# Patient Record
(demographics unavailable — no encounter records)

---

## 2024-10-11 NOTE — HISTORY OF PRESENT ILLNESS
[FreeTextEntry1] : 58M with CAD s/p multiple PCI who presents for follow up of angina  Under a lot of work stress, working long hours, late nights, weekends On and off pressure in the chest, usually associated with stressful situations at work  Similar to typical anginal symptoms On and off ranolazine, stopped taking as of late as he had not been feeling as much chest discomfort Last crt 1.24, stable  Now on asa monotherapy  HISTORY:  In 2019- pt was in a restaurant, started to feel a discomfort in his chest. Called EMS, ended up with 2 stents at [facility] Similar episodes in August 2022, felt like he was "overheating" went to [facility] and ended up with 2 more stents 1 month later to [facility] for ongoing CP, was thought to be pericarditis, put on colchicine  Pt started feeling pressure in his chest Went to [facility], was told that cardiac enzymes were negative, had cardiac cath with a 50% RCA lesion, patent stents (per patient report)   Nonsmoker. Works for [employer] as    ECG: SR [facility] 6/2019: PCI to Cx, LAD x2 (XIENCE) C 8/29/22: PCI x pLAD, D1 (SYNERGY)  TTE 9/2023:  1. The left ventricular cavity is normal size. Left ventricular wall thickness is normal. Left ventricular systolic function is normal with an ejection fraction of 66 % by King's method of disks. 2. There is normal left ventricular diastolic function. 3. Right ventricular cavity is normal in size, normal wall thickness and normal systolic function. The tricuspid annular plane systolic excursion (TAPSE) is 2.4 cm (normal >=1.7 cm). 4. The left atrium is normal in size. 5. No significant valvular disease.  Asa 81mg Atorvastatin 80mg Zetia 10mg  Ranolazine 500mg BID   Losartan 50mg Toprol 25mg  Jardiance 25mg Trulicity

## 2024-10-11 NOTE — DISCUSSION/SUMMARY
[FreeTextEntry1] : CAD: s/p PCI x5. Anginal symptoms improved with ranolazine in past, now off. Rare angina with stress.  OK to hold on Ranolazine, can resume if anginal symptoms start to become more consistent  Cont Toprol  On asa monotherapy given easy bruising   Cont high dose statin- lipids improved, last LDL 82  HTN: BP good. Cont meds. Crt improved watch closely   HLD: Lipids back down. Cont high dose statin. Watch diet. Normal AST/ALT  DM: Last a1c 9.7%, needs more aggressive control  RV 6M

## 2024-10-31 NOTE — PHYSICAL EXAM
[No Acute Distress] : no acute distress [Well Nourished] : well nourished [Well Developed] : well developed [Well-Appearing] : well-appearing [Normal Sclera/Conjunctiva] : normal sclera/conjunctiva [PERRL] : pupils equal round and reactive to light [EOMI] : extraocular movements intact [Normal Outer Ear/Nose] : the outer ears and nose were normal in appearance [Normal Oropharynx] : the oropharynx was normal [No JVD] : no jugular venous distention [No Lymphadenopathy] : no lymphadenopathy [Supple] : supple [Thyroid Normal, No Nodules] : the thyroid was normal and there were no nodules present [No Respiratory Distress] : no respiratory distress  [No Accessory Muscle Use] : no accessory muscle use [Clear to Auscultation] : lungs were clear to auscultation bilaterally [Normal Rate] : normal rate  [Regular Rhythm] : with a regular rhythm [Normal S1, S2] : normal S1 and S2 [No Murmur] : no murmur heard [No Carotid Bruits] : no carotid bruits [No Abdominal Bruit] : a ~M bruit was not heard ~T in the abdomen [No Varicosities] : no varicosities [Pedal Pulses Present] : the pedal pulses are present [No Edema] : there was no peripheral edema [No Palpable Aorta] : no palpable aorta [No Extremity Clubbing/Cyanosis] : no extremity clubbing/cyanosis [Soft] : abdomen soft [Non Tender] : non-tender [Non-distended] : non-distended [No Masses] : no abdominal mass palpated [No HSM] : no HSM [Normal Bowel Sounds] : normal bowel sounds [Normal Posterior Cervical Nodes] : no posterior cervical lymphadenopathy [Normal Anterior Cervical Nodes] : no anterior cervical lymphadenopathy [No CVA Tenderness] : no CVA  tenderness [No Spinal Tenderness] : no spinal tenderness [No Joint Swelling] : no joint swelling [Grossly Normal Strength/Tone] : grossly normal strength/tone [No Rash] : no rash [Coordination Grossly Intact] : coordination grossly intact [No Focal Deficits] : no focal deficits [Normal Gait] : normal gait [Deep Tendon Reflexes (DTR)] : deep tendon reflexes were 2+ and symmetric [Normal Affect] : the affect was normal [Normal Insight/Judgement] : insight and judgment were intact [de-identified] : R hand 5th digit duptyrens. severe. [de-identified] : R shin- superficial abrasion- not infected

## 2024-10-31 NOTE — HISTORY OF PRESENT ILLNESS
[FreeTextEntry1] : leg scrape The patient is here for a follow up visit to review their medications and chronic medical conditions.  cad,htn [de-identified] : Here for follow up visit. Doing well. Appetitie, sleep,bms,voiding, mood all ok.  Interim and relevant labs, imaging and consultations reviewed.  scraped R shin.

## 2024-10-31 NOTE — ASSESSMENT
[FreeTextEntry1] : CAD- s/p 2 addl stents, stable, cardio f/u , cont atorvastatin,asa,effient,metoprolol-  DM2-, cont , glimepiride, jardiance, alogliptin, trulicity Htn- stable, cont losartan,metoprolol, HLD- stable, cont atorvastatin Duputyrens- f/u with hand surg [place]- hearing aides Bilat Foot Callus- pod eval. R shin abrasion- bacitracin, dpd.  callous- pod eval allergic rhinitis- ryaltris orthostatic hypotension- hydrate. stable GERD- lifestyle mod, trial pantopr.  Pt. was encouraged to do all relevant screening tests including but not limited to colonoscopy, annual psa, annual skin exam.  see optho yearly chk feet nightly take all meds as prescribed aware of complications of diabetes including but not limited to retinopathy,neuropathy, cvd, esrd,cva,amputations.  aware of the importance of diet and exercise and maintaining an adequate weight chk labs   Total time spent 40 min. ( 25 min was FTF and 15 min was chart review and documentation for a total of 40 min. ).

## 2024-11-07 NOTE — PHYSICAL EXAM
[No Acute Distress] : no acute distress [Well Nourished] : well nourished [Well Developed] : well developed [Well-Appearing] : well-appearing [Normal Sclera/Conjunctiva] : normal sclera/conjunctiva [PERRL] : pupils equal round and reactive to light [EOMI] : extraocular movements intact [Normal Outer Ear/Nose] : the outer ears and nose were normal in appearance [Normal Oropharynx] : the oropharynx was normal [No JVD] : no jugular venous distention [No Lymphadenopathy] : no lymphadenopathy [Supple] : supple [Thyroid Normal, No Nodules] : the thyroid was normal and there were no nodules present [No Respiratory Distress] : no respiratory distress  [No Accessory Muscle Use] : no accessory muscle use [Clear to Auscultation] : lungs were clear to auscultation bilaterally [Normal Rate] : normal rate  [Regular Rhythm] : with a regular rhythm [Normal S1, S2] : normal S1 and S2 [No Murmur] : no murmur heard [No Carotid Bruits] : no carotid bruits [No Abdominal Bruit] : a ~M bruit was not heard ~T in the abdomen [No Varicosities] : no varicosities [Pedal Pulses Present] : the pedal pulses are present [No Edema] : there was no peripheral edema [No Palpable Aorta] : no palpable aorta [No Extremity Clubbing/Cyanosis] : no extremity clubbing/cyanosis [Soft] : abdomen soft [Non Tender] : non-tender [Non-distended] : non-distended [No Masses] : no abdominal mass palpated [No HSM] : no HSM [Normal Bowel Sounds] : normal bowel sounds [Normal Posterior Cervical Nodes] : no posterior cervical lymphadenopathy [Normal Anterior Cervical Nodes] : no anterior cervical lymphadenopathy [No CVA Tenderness] : no CVA  tenderness [No Spinal Tenderness] : no spinal tenderness [No Joint Swelling] : no joint swelling [Grossly Normal Strength/Tone] : grossly normal strength/tone [No Rash] : no rash [Coordination Grossly Intact] : coordination grossly intact [No Focal Deficits] : no focal deficits [Normal Gait] : normal gait [Deep Tendon Reflexes (DTR)] : deep tendon reflexes were 2+ and symmetric [Normal Affect] : the affect was normal [Normal Insight/Judgement] : insight and judgment were intact [de-identified] : R hand 5th digit duptyrens. severe. [de-identified] : R shin- superficial abrasion- min surrounding erythema-gran tissue, no streaking or pus.

## 2024-11-07 NOTE — HISTORY OF PRESENT ILLNESS
[FreeTextEntry1] : wound chk [de-identified] : R leg red vertigo? - no spinning sensation. 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 no falls or headache.  pt has dm.

## 2024-11-07 NOTE — ASSESSMENT
[FreeTextEntry1] : CAD- s/p 2 addl stents, stable, cardio f/u , cont atorvastatin,asa,effient,metoprolol-  DM2-, cont , glimepiride, jardiance, alogliptin, trulicity Htn- stable, cont losartan,metoprolol, HLD- stable, cont atorvastatin Duputyrens- f/u with hand surg [facility]- hearing aides Bilat Foot Callus- pod eval. R shin abrasion- bacitracin, dpd.I dont feel its infected but pt is concerned. its on anterior shin and pt is diabetic. will start clinda x 5d, Monitor symptoms and report any changes or concerns.   callous- pod eval allergic rhinitis- ryaltris lightheaded/imbalance- neuro eval- ddx- diab neuropathy/orthostatic hypotension/med side effect.  GERD- lifestyle mod, trial pantopr.  Pt. was encouraged to do all relevant screening tests including but not limited to colonoscopy, annual psa, annual skin exam.  see optho yearly chk feet nightly take all meds as prescribed aware of complications of diabetes including but not limited to retinopathy,neuropathy, cvd, esrd,cva,amputations.  aware of the importance of diet and exercise and maintaining an adequate weight chk labs   Total time 30 min ( 20 min. FTF and 10 min chart review and documentation.) .

## 2024-12-13 NOTE — DATA REVIEWED
[de-identified] : An audiogram was ordered and performed including pure tones, tympanometry and speech testing for the patients complaint of hearing loss I have independently reviewed the patient's audiogram from today and my findings include   AS profound SNHL 250-8k hz. AD Moderately severe-severe SNHL 250-8k hz. Tinnitus Match reports close to 250 hz but inconclusive could not mask bone AS

## 2024-12-13 NOTE — ASSESSMENT
[FreeTextEntry1] : 56Y M following for Left sided sensory neural deafness and Right Severe SNHL currently using hearing aids on the right side. 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 Right hearing loss due to chicken pox when he was a kid  Personally reviewed Audiogram shows AS profound SNHL 250-8k hz. AD Moderately severe-severe SNHL 250-8k hz. Tinnitus Match reports close to 250 hz but inconclusive could not mask bone AS  Compared audiogram from [facility] on 7/15/24 possible acute decreased in Low frequency hearing loss in right ear. Given clinical picture and patient already limited hearing. Patient would prefer a trial of PO steroids to see if there can be some improvement in hearing   Recommend Right Sudden Sensorineural Hearing Loss -Discussed variety of disorders affecting the ear can cause SSHL, but only about 10 percent of people diagnosed with SSHL have an identifiable cause. Majority is idiopathic.  -Explained that the only known treatment for sudden deafness, especially when the cause is unknown, is corticosteroids. Steroids treat the disorders by working to reducing inflammation, decreasing swelling within the nerve of hearing -Explained that sometimes despite appropriate treatment in a timely manner hearing may not return to baseline. -Discussed the risk and benefits between PO steroids and Intratympanic steroids. 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 -Continue using Hearing Aids adjusted for the most current audiogram. Patient can look into BiCROS  Tinnitus -Discussed that Hearing Aids may help with relieving some of the tinnitus. Tinnitus usually follows the same pattern of hearing loss and can be attributed to phantom sounds in the brain filling in for the loss of hearing in those particular frequencies -Discussed that Tinnitus usually can be attributed to phantom sounds in the brain filling in for sounds. If the tinnitus is brief only last for a few seconds with no loss of hearing associated. It is usually physiological and can be management conservatively -Discussed notched therapy, masking therapy, cognitive behavioral therapy, factors that may influence tinnitus, and discussed limited benefit of tinnitus supplements.  -Return to clinic in 2 weeks or sooner if new/worsen symptoms present

## 2024-12-13 NOTE — HISTORY OF PRESENT ILLNESS
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 Denies otalgia, otorrhea, recent ear infections, dizziness, vertigo, headaches related to ears, recent fevers.

## 2024-12-27 NOTE — DATA REVIEWED
[de-identified] : An audiogram was ordered and performed including pure tones, tympanometry and speech testing for the patients complaint of hearing loss I have independently reviewed the patient's audiogram from today and my findings include Right - moderate to profound sensorineural hearing loss  Left - DNT -known profound HL Tymp Right - CNS Left - Type B tympanogram consistent with conductive pathology  Matched tinnitus closest to 250Hz @ 70dB

## 2024-12-27 NOTE — HISTORY OF PRESENT ILLNESS
[de-identified] : 59 year old man follow up for sudden hearing loss  History of bilateral hearing loss, wearing hearing aid on Right only. 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 Right hear loss due to chicken pox when he was a kid Reports sudden right sided hearing loss (12/12/24) after removing hearing aid and heard alot of "noise". States completed oral steroids as prescribed.  States hearing has improved.  Denies otalgia, otorrhea, recent ear infections, dizziness, vertigo, headaches related to ears, recent fevers. Patient's blood pressure was 170/91 . Patient was advised to follow up with PCP for high blood pressure.

## 2024-12-27 NOTE — ASSESSMENT
[FreeTextEntry1] : 56Y M following for Left sided sensory neural deafness and Right Severe SNHL currently using hearing aids on the right side. 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 Right hearing loss due to chicken pox when he was a kid  12/13/24 Audiogram shows AS profound SNHL 250-8k hz. AD Moderately severe-severe SNHL 250-8k hz. Tinnitus Match reports close to 250 hz but inconclusive could not mask bone AS  Personally reviewed Audiogram shows Right - moderate to profound sensorineural hearing loss. Left - DNT -known profound HL. AD Tymp CNS. AS Type B Matched tinnitus closest to 250Hz @ 70dB Improved low frequency hearing loss from 12/13/24  Recommend Right Sudden Sensorineural Hearing Loss -Improved -Completed Prednisone PO. States hearing is back to baseline. 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 -Continue using Hearing Aids adjusted for the most current audiogram. Patient can look into BiCROS  Tinnitus -Discussed that Hearing Aids may help with relieving some of the tinnitus. Tinnitus usually follows the same pattern of hearing loss and can be attributed to phantom sounds in the brain filling in for the loss of hearing in those particular frequencies -Discussed that Tinnitus usually can be attributed to phantom sounds in the brain filling in for sounds. If the tinnitus is brief only last for a few seconds with no loss of hearing associated. It is usually physiological and can be management conservatively -Discussed notched therapy, masking therapy, cognitive behavioral therapy, factors that may influence tinnitus, and discussed limited benefit of tinnitus supplements.  -Return to clinic as needed or sooner if new/worsen symptoms present

## 2024-12-28 NOTE — PHYSICAL EXAM
[No Acute Distress] : no acute distress [Well Nourished] : well nourished [Well Developed] : well developed [Well-Appearing] : well-appearing [Normal Sclera/Conjunctiva] : normal sclera/conjunctiva [PERRL] : pupils equal round and reactive to light [EOMI] : extraocular movements intact [Normal Outer Ear/Nose] : the outer ears and nose were normal in appearance [Normal Oropharynx] : the oropharynx was normal [No JVD] : no jugular venous distention [No Lymphadenopathy] : no lymphadenopathy [Supple] : supple [Thyroid Normal, No Nodules] : the thyroid was normal and there were no nodules present [No Respiratory Distress] : no respiratory distress  [No Accessory Muscle Use] : no accessory muscle use [Clear to Auscultation] : lungs were clear to auscultation bilaterally [Normal Rate] : normal rate  [Regular Rhythm] : with a regular rhythm [Normal S1, S2] : normal S1 and S2 [No Murmur] : no murmur heard [No Carotid Bruits] : no carotid bruits [No Abdominal Bruit] : a ~M bruit was not heard ~T in the abdomen [No Varicosities] : no varicosities [Pedal Pulses Present] : the pedal pulses are present [No Edema] : there was no peripheral edema [No Palpable Aorta] : no palpable aorta [No Extremity Clubbing/Cyanosis] : no extremity clubbing/cyanosis [Soft] : abdomen soft [Non Tender] : non-tender [Non-distended] : non-distended [No Masses] : no abdominal mass palpated [No HSM] : no HSM [Normal Bowel Sounds] : normal bowel sounds [Normal Posterior Cervical Nodes] : no posterior cervical lymphadenopathy [Normal Anterior Cervical Nodes] : no anterior cervical lymphadenopathy [No CVA Tenderness] : no CVA  tenderness [No Spinal Tenderness] : no spinal tenderness [No Joint Swelling] : no joint swelling [Grossly Normal Strength/Tone] : grossly normal strength/tone [No Rash] : no rash [Coordination Grossly Intact] : coordination grossly intact [No Focal Deficits] : no focal deficits [Normal Gait] : normal gait [Deep Tendon Reflexes (DTR)] : deep tendon reflexes were 2+ and symmetric [Normal Affect] : the affect was normal [Normal Insight/Judgement] : insight and judgment were intact [de-identified] : hearing aids [de-identified] : no hernias [de-identified] : R hand 5th digit duptyrens. severe.

## 2024-12-28 NOTE — ASSESSMENT
[FreeTextEntry1] : CAD- s/p 2 addl stents, stable, cardio f/u , cont atorvastatin,asa,effient,metoprolol- start colchicine  DM2-, cont , glimepiride, jardiance, alogliptin, trulicity Htn- stable, cont losartan,metoprolol, HLD- stable, cont atorvastatin Duputyrens- f/u with hand surg [place]- hearing aides Bilat Foot Callus- pod eval. callous- pod eval allergic rhinitis- ryaltris lightheaded/imbalance- neuro eval- ddx- diab neuropathy/orthostatic hypotension/med side effect.  GERD- lifestyle mod, trial pantopr.  acute prostatitis vs uti- chk u/a, c/s, nitrofurantoin Pt. was encouraged to do all relevant screening tests including but not limited to colonoscopy, annual psa, annual skin exam.  see optho yearly chk feet nightly take all meds as prescribed aware of complications of diabetes including but not limited to retinopathy,neuropathy, cvd, esrd,cva,amputations.  aware of the importance of diet and exercise and maintaining an adequate weight chk labs  Discussed the importance of screening for colon cancer. Reviewed screening reccomendations including colonoscopy, Cologuard and Fit DNA testing. I strongly encouraged colonoscopy as that is the best screening test to detect both colon cancer and polyps and is the gold standard.  Discussed the importance and benefit of a healthy lifestyle including a heart healthy diet such as the Mediterranean diet and regular exercise as well as 7 hours of sleep nightly.

## 2024-12-28 NOTE — HEALTH RISK ASSESSMENT
[Good] : ~his/her~  mood as  good [Never (0 pts)] : Never (0 points) [No] : In the past 12 months have you used drugs other than those required for medical reasons? No [No falls in past year] : Patient reported no falls in the past year [0] : 2) Feeling down, depressed, or hopeless: Not at all (0) [PHQ-2 Negative - No further assessment needed] : PHQ-2 Negative - No further assessment needed [Never] : Never [None] : None [With Family] : lives with family [Employed] : employed [Single] : single [Fully functional (bathing, dressing, toileting, transferring, walking, feeding)] : Fully functional (bathing, dressing, toileting, transferring, walking, feeding) [Fully functional (using the telephone, shopping, preparing meals, housekeeping, doing laundry, using] : Fully functional and needs no help or supervision to perform IADLs (using the telephone, shopping, preparing meals, housekeeping, doing laundry, using transportation, managing medications and managing finances) [Reports changes in hearing] : Reports changes in hearing [Audit-CScore] : 0 [de-identified] : none [de-identified] : reg [[id number]] : 0 [Change in mental status noted] : No change in mental status noted [Language] : denies difficulty with language [Behavior] : denies difficulty with behavior [Handling Complex Tasks] : denies difficulty handling complex tasks [Reasoning] : denies difficulty with reasoning [Reports changes in vision] : Reports no changes in vision [Reports changes in dental health] : Reports no changes in dental health [de-identified] : saw retinologist [AdvancecareDate] : 12/26/24

## 2024-12-28 NOTE — PHYSICAL EXAM
[No Acute Distress] : no acute distress [Well Nourished] : well nourished [Well Developed] : well developed [Well-Appearing] : well-appearing [Normal Sclera/Conjunctiva] : normal sclera/conjunctiva [PERRL] : pupils equal round and reactive to light [EOMI] : extraocular movements intact [Normal Outer Ear/Nose] : the outer ears and nose were normal in appearance [Normal Oropharynx] : the oropharynx was normal [No JVD] : no jugular venous distention [No Lymphadenopathy] : no lymphadenopathy [Supple] : supple [Thyroid Normal, No Nodules] : the thyroid was normal and there were no nodules present [No Respiratory Distress] : no respiratory distress  [No Accessory Muscle Use] : no accessory muscle use [Clear to Auscultation] : lungs were clear to auscultation bilaterally [Normal Rate] : normal rate  [Regular Rhythm] : with a regular rhythm [Normal S1, S2] : normal S1 and S2 [No Murmur] : no murmur heard [No Carotid Bruits] : no carotid bruits [No Abdominal Bruit] : a ~M bruit was not heard ~T in the abdomen [No Varicosities] : no varicosities [Pedal Pulses Present] : the pedal pulses are present [No Edema] : there was no peripheral edema [No Palpable Aorta] : no palpable aorta [No Extremity Clubbing/Cyanosis] : no extremity clubbing/cyanosis [Soft] : abdomen soft [Non Tender] : non-tender [Non-distended] : non-distended [No Masses] : no abdominal mass palpated [No HSM] : no HSM [Normal Bowel Sounds] : normal bowel sounds [Normal Posterior Cervical Nodes] : no posterior cervical lymphadenopathy [Normal Anterior Cervical Nodes] : no anterior cervical lymphadenopathy [No CVA Tenderness] : no CVA  tenderness [No Spinal Tenderness] : no spinal tenderness [No Joint Swelling] : no joint swelling [Grossly Normal Strength/Tone] : grossly normal strength/tone [No Rash] : no rash [Coordination Grossly Intact] : coordination grossly intact [No Focal Deficits] : no focal deficits [Normal Gait] : normal gait [Deep Tendon Reflexes (DTR)] : deep tendon reflexes were 2+ and symmetric [Normal Affect] : the affect was normal [Normal Insight/Judgement] : insight and judgment were intact [de-identified] : hearing aids [de-identified] : no hernias [de-identified] : R hand 5th digit duptyrens. severe.

## 2024-12-28 NOTE — HISTORY OF PRESENT ILLNESS
[FreeTextEntry1] : abisai [de-identified] : urinary sympt x wks , worse over the past few days. no buring. no hesitancy. no fever or chills.  no flank pain.  had some hearing loss in his good ear- saw ent- started on pred 60 taper, does not chk his bgs.  sees pod q3m sees card sees optho/retina.  sees [facility]. sees colorectal. Reviewed all interim as well as relevant prior consultations, labs and radiological studies.

## 2024-12-28 NOTE — ASSESSMENT
[FreeTextEntry1] : CAD- s/p 2 addl stents, stable, cardio f/u , cont atorvastatin,asa,effient,metoprolol- start colchicine  DM2-, cont , glimepiride, jardiance, alogliptin, trulicity Htn- stable, cont losartan,metoprolol, HLD- stable, cont atorvastatin Duputyrens- f/u with hand surg [place]- hearing aides Bilat Foot Callus- pod eval. callous- pod eval allergic rhinitis- ryaltris lightheaded/imbalance- neuro eval- ddx- diab neuropathy/orthostatic hypotension/med side effect.  GERD- lifestyle mod, trial pantopr.  acute prostatitis vs uti- chk u/a, c/s, nitrofurantoin Pt. was encouraged to do all relevant screening tests including but not limited to colonoscopy, annual psa, annual skin exam.  see optho yearly chk feet nightly take all meds as prescribed aware of complications of diabetes including but not limited to retinopathy,neuropathy, cvd, esrd,cva,amputations.  aware of the importance of diet and exercise and maintaining an adequate weight chk labs  Discussed the importance of screening for colon cancer. Reviewed screening reccomendations including colonoscopy, Cologuard and Fit DNA testing. I strongly encouraged colonoscopy as that is the best screening test to detect both colon cancer and polyps and is the gold standard.  Discussed the importance and benefit of a healthy lifestyle including a heart healthy diet such as the Mediterranean diet and regular exercise as well as 7 hours of sleep nightly.

## 2024-12-28 NOTE — HEALTH RISK ASSESSMENT
[Good] : ~his/her~  mood as  good [Never (0 pts)] : Never (0 points) [No] : In the past 12 months have you used drugs other than those required for medical reasons? No [No falls in past year] : Patient reported no falls in the past year [0] : 2) Feeling down, depressed, or hopeless: Not at all (0) [PHQ-2 Negative - No further assessment needed] : PHQ-2 Negative - No further assessment needed [Never] : Never [None] : None [With Family] : lives with family [Employed] : employed [Single] : single [Fully functional (bathing, dressing, toileting, transferring, walking, feeding)] : Fully functional (bathing, dressing, toileting, transferring, walking, feeding) [Fully functional (using the telephone, shopping, preparing meals, housekeeping, doing laundry, using] : Fully functional and needs no help or supervision to perform IADLs (using the telephone, shopping, preparing meals, housekeeping, doing laundry, using transportation, managing medications and managing finances) [Reports changes in hearing] : Reports changes in hearing [Audit-CScore] : 0 [de-identified] : none [de-identified] : reg [WQT4Hpaob] : 0 [Change in mental status noted] : No change in mental status noted [Language] : denies difficulty with language [Behavior] : denies difficulty with behavior [Handling Complex Tasks] : denies difficulty handling complex tasks [Reasoning] : denies difficulty with reasoning [Reports changes in vision] : Reports no changes in vision [Reports changes in dental health] : Reports no changes in dental health [de-identified] : saw retinologist [AdvancecareDate] : 12/26/24

## 2024-12-28 NOTE — HISTORY OF PRESENT ILLNESS
[FreeTextEntry1] : abisai [de-identified] : urinary sympt x wks , worse over the past few days. no buring. no hesitancy. no fever or chills.  no flank pain.  had some hearing loss in his good ear- saw ent- started on pred 60 taper, does not chk his bgs.  sees pod q3m sees card sees optho/retina.  sees [facility]. sees colorectal. Reviewed all interim as well as relevant prior consultations, labs and radiological studies.

## 2025-01-25 NOTE — ASSESSMENT
[FreeTextEntry1] : CAD- s/p 2 addl stents, stable, cardio f/u , cont atorvastatin,asa,effient,metoprolol- colchicine  DM2-, cont , glimepiride, jardiance, alogliptin, trulicity Htn- stable, cont losartan,metoprolol, HLD- stable, cont atorvastatin Duputyrens- f/u with hand surg [place]- hearing aides Bilat Foot Callus- pod eval. callous- pod eval allergic rhinitis- ryaltris lightheaded/imbalance- resolved GERD- lifestyle mod, trial pantopr.  acute prostatitis vs uti- resolved Pt. was encouraged to do all relevant screening tests including but not limited to colonoscopy, annual psa, annual skin exam.  see optho yearly chk feet nightly take all meds as prescribed aware of complications of diabetes including but not limited to retinopathy,neuropathy, cvd, esrd,cva,amputations.  aware of the importance of diet and exercise and maintaining an adequate weight chk labs  Discussed the importance of screening for colon cancer. Reviewed screening reccomendations including colonoscopy, Cologuard and Fit DNA testing. I strongly encouraged colonoscopy as that is the best screening test to detect both colon cancer and polyps and is the gold standard.  Discussed the importance and benefit of a healthy lifestyle including a heart healthy diet such as the Mediterranean diet and regular exercise as well as 7 hours of sleep nightly.

## 2025-01-25 NOTE — HISTORY OF PRESENT ILLNESS
[FreeTextEntry1] : The patient is here for a follow up visit to review their medications and chronic medical conditions.  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 uti sympt resolved Otherwise feels good. Appet, sleep, bms, voiding, mood all ok. no pain.  Reviewed all interim as well as relevant prior consultations, labs and radiological studies.

## 2025-01-25 NOTE — PHYSICAL EXAM
[No Acute Distress] : no acute distress [Well Nourished] : well nourished [Well Developed] : well developed [Well-Appearing] : well-appearing [Normal Sclera/Conjunctiva] : normal sclera/conjunctiva [PERRL] : pupils equal round and reactive to light [EOMI] : extraocular movements intact [Normal Outer Ear/Nose] : the outer ears and nose were normal in appearance [Normal Oropharynx] : the oropharynx was normal [No JVD] : no jugular venous distention [No Lymphadenopathy] : no lymphadenopathy [Supple] : supple [Thyroid Normal, No Nodules] : the thyroid was normal and there were no nodules present [No Respiratory Distress] : no respiratory distress  [No Accessory Muscle Use] : no accessory muscle use [Clear to Auscultation] : lungs were clear to auscultation bilaterally [Normal Rate] : normal rate  [Regular Rhythm] : with a regular rhythm [Normal S1, S2] : normal S1 and S2 [No Murmur] : no murmur heard [No Carotid Bruits] : no carotid bruits [No Abdominal Bruit] : a ~M bruit was not heard ~T in the abdomen [No Varicosities] : no varicosities [Pedal Pulses Present] : the pedal pulses are present [No Edema] : there was no peripheral edema [No Palpable Aorta] : no palpable aorta [No Extremity Clubbing/Cyanosis] : no extremity clubbing/cyanosis [Soft] : abdomen soft [Non Tender] : non-tender [Non-distended] : non-distended [No Masses] : no abdominal mass palpated [No HSM] : no HSM [Normal Bowel Sounds] : normal bowel sounds [Normal Posterior Cervical Nodes] : no posterior cervical lymphadenopathy [Normal Anterior Cervical Nodes] : no anterior cervical lymphadenopathy [No CVA Tenderness] : no CVA  tenderness [No Spinal Tenderness] : no spinal tenderness [No Joint Swelling] : no joint swelling [Grossly Normal Strength/Tone] : grossly normal strength/tone [No Rash] : no rash [Coordination Grossly Intact] : coordination grossly intact [No Focal Deficits] : no focal deficits [Normal Gait] : normal gait [Deep Tendon Reflexes (DTR)] : deep tendon reflexes were 2+ and symmetric [Normal Affect] : the affect was normal [Normal Insight/Judgement] : insight and judgment were intact [de-identified] : hearing aids [de-identified] : R hand 5th digit duptyrens. severe.

## 2025-01-25 NOTE — ASSESSMENT
[FreeTextEntry1] : CAD- s/p 2 addl stents, stable, cardio f/u , cont atorvastatin,asa,effient,metoprolol- colchicine  DM2-, cont , glimepiride, jardiance, alogliptin, trulicity Htn- stable, cont losartan,metoprolol, HLD- stable, cont atorvastatin Duputyrens- f/u with hand surg [place]- hearing aides Bilat Foot Callus- pod eval. callous- pod eval allergic rhinitis- ryaltris lightheaded/imbalance- resolved GERD- lifestyle mod, trial pantopr.  acute prostatitis vs uti- resolved Pt. was encouraged to do all relevant screening tests including but not limited to colonoscopy, annual psa, annual skin exam.  see optho yearly chk feet nightly take all meds as prescribed aware of complications of diabetes including but not limited to retinopathy,neuropathy, cvd, esrd,cva,amputations.  aware of the importance of diet and exercise and maintaining an adequate weight chk labs  Discussed the importance of screening for colon cancer. Reviewed screening reccomendations including colonoscopy, Cologuard and Fit DNA testing. I strongly encouraged colonoscopy as that is the best screening test to detect both colon cancer and polyps and is the gold standard.  Discussed the importance and benefit of a healthy lifestyle including a heart healthy diet such as the Mediterranean diet and regular exercise as well as 7 hours of sleep nightly.

## 2025-01-25 NOTE — END OF VISIT
[de-identified] : The patient is a 44-year-old female who presents with left knee pain.  The left knee pain is acute on chronic pain.  Patient has a history of ACL reconstruction in the late 90's from basketball injury.  The patient recently started to work out and has increased left knee pain.  She states that the left knee is not locking, catching or giving out on her.  Her main complaint is a pain with standing and walking, swelling and bending the knee/physical activities.  Pain scale in office today 7/10.  No other complaints.  Patient is walking with a limp.  Patient uses a OTC brace which helped her at home.  No numbness or tingling to the left leg.  No other complaints.   [Time Spent: ___ minutes] : I have spent [unfilled] minutes of time on the encounter which excludes teaching and separately reported services.

## 2025-01-25 NOTE — PHYSICAL EXAM
[No Acute Distress] : no acute distress [Well Nourished] : well nourished [Well Developed] : well developed [Well-Appearing] : well-appearing [Normal Sclera/Conjunctiva] : normal sclera/conjunctiva [PERRL] : pupils equal round and reactive to light [EOMI] : extraocular movements intact [Normal Outer Ear/Nose] : the outer ears and nose were normal in appearance [Normal Oropharynx] : the oropharynx was normal [No JVD] : no jugular venous distention [No Lymphadenopathy] : no lymphadenopathy [Supple] : supple [Thyroid Normal, No Nodules] : the thyroid was normal and there were no nodules present [No Respiratory Distress] : no respiratory distress  [No Accessory Muscle Use] : no accessory muscle use [Clear to Auscultation] : lungs were clear to auscultation bilaterally [Normal Rate] : normal rate  [Regular Rhythm] : with a regular rhythm [Normal S1, S2] : normal S1 and S2 [No Murmur] : no murmur heard [No Carotid Bruits] : no carotid bruits [No Abdominal Bruit] : a ~M bruit was not heard ~T in the abdomen [No Varicosities] : no varicosities [Pedal Pulses Present] : the pedal pulses are present [No Edema] : there was no peripheral edema [No Palpable Aorta] : no palpable aorta [No Extremity Clubbing/Cyanosis] : no extremity clubbing/cyanosis [Soft] : abdomen soft [Non Tender] : non-tender [Non-distended] : non-distended [No Masses] : no abdominal mass palpated [No HSM] : no HSM [Normal Bowel Sounds] : normal bowel sounds [Normal Posterior Cervical Nodes] : no posterior cervical lymphadenopathy [Normal Anterior Cervical Nodes] : no anterior cervical lymphadenopathy [No CVA Tenderness] : no CVA  tenderness [No Spinal Tenderness] : no spinal tenderness [No Joint Swelling] : no joint swelling [Grossly Normal Strength/Tone] : grossly normal strength/tone [No Rash] : no rash [Coordination Grossly Intact] : coordination grossly intact [No Focal Deficits] : no focal deficits [Normal Gait] : normal gait [Deep Tendon Reflexes (DTR)] : deep tendon reflexes were 2+ and symmetric [Normal Affect] : the affect was normal [Normal Insight/Judgement] : insight and judgment were intact [de-identified] : hearing aids [de-identified] : R hand 5th digit duptyrens. severe.

## 2025-01-25 NOTE — HISTORY OF PRESENT ILLNESS
[FreeTextEntry1] : The patient is here for a follow up visit to review their medications and chronic medical conditions.  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 uti sympt resolved Otherwise feels good. Appet, sleep, bms, voiding, mood all ok. no pain.  Reviewed all interim as well as relevant prior consultations, labs and radiological studies.  Yes

## 2025-02-25 NOTE — HISTORY OF PRESENT ILLNESS
[FreeTextEntry1] : The patient is here for a follow up visit to review their medications and chronic medical conditions.  cad,dm [de-identified] : left tmj pain. headache. x few days.  Otherwise feels good. Appet, sleep, bms, voiding, mood all ok Reviewed all interim as well as relevant prior consultations, labs and radiological studies.

## 2025-02-25 NOTE — HISTORY OF PRESENT ILLNESS
[FreeTextEntry1] : The patient is here for a follow up visit to review their medications and chronic medical conditions.  cad,dm [de-identified] : left tmj pain. headache. x few days.  Otherwise feels good. Appet, sleep, bms, voiding, mood all ok Reviewed all interim as well as relevant prior consultations, labs and radiological studies.

## 2025-02-25 NOTE — ASSESSMENT
[FreeTextEntry1] : CAD- s/p 2 addl stents, stable, cardio f/u , cont atorvastatin,asa,effient,metoprolol- colchicine  DM2-, cont , glimepiride, jardiance, alogliptin, trulicity Htn- stable, cont losartan,metoprolol, HLD- stable, cont atorvastatin Duputyrens- f/u with hand surg [place]- hearing aides Bilat Foot Callus- pod eval. allergic rhinitis- ryaltris GERD- lifestyle mod, pantopr.  tmj- avoid nsaids due to asa/cad. avoid chewy foods,tylenol, warm compress.  headache- resolves spont. monitor for now. if recurs. consider muscle relax.  Pt. was encouraged to do all relevant screening tests including but not limited to colonoscopy, annual psa, annual skin exam.  see optho yearly chk feet nightly take all meds as prescribed aware of complications of diabetes including but not limited to retinopathy,neuropathy, cvd, esrd,cva,amputations.  aware of the importance of diet and exercise and maintaining an adequate weight chk labs  Discussed the importance and benefit of a healthy lifestyle including a heart healthy diet such as the Mediterranean diet and regular exercise as well as 7 hours of sleep nightly.

## 2025-02-25 NOTE — PHYSICAL EXAM
[No Acute Distress] : no acute distress [Well Nourished] : well nourished [Well Developed] : well developed [Well-Appearing] : well-appearing [Normal Sclera/Conjunctiva] : normal sclera/conjunctiva [PERRL] : pupils equal round and reactive to light [EOMI] : extraocular movements intact [Normal Outer Ear/Nose] : the outer ears and nose were normal in appearance [Normal Oropharynx] : the oropharynx was normal [No JVD] : no jugular venous distention [No Lymphadenopathy] : no lymphadenopathy [Supple] : supple [Thyroid Normal, No Nodules] : the thyroid was normal and there were no nodules present [No Respiratory Distress] : no respiratory distress  [No Accessory Muscle Use] : no accessory muscle use [Clear to Auscultation] : lungs were clear to auscultation bilaterally [Normal Rate] : normal rate  [Regular Rhythm] : with a regular rhythm [Normal S1, S2] : normal S1 and S2 [No Murmur] : no murmur heard [No Carotid Bruits] : no carotid bruits [No Abdominal Bruit] : a ~M bruit was not heard ~T in the abdomen [No Varicosities] : no varicosities [Pedal Pulses Present] : the pedal pulses are present [No Edema] : there was no peripheral edema [No Palpable Aorta] : no palpable aorta [No Extremity Clubbing/Cyanosis] : no extremity clubbing/cyanosis [Soft] : abdomen soft [Non Tender] : non-tender [Non-distended] : non-distended [No Masses] : no abdominal mass palpated [No HSM] : no HSM [Normal Bowel Sounds] : normal bowel sounds [Normal Posterior Cervical Nodes] : no posterior cervical lymphadenopathy [Normal Anterior Cervical Nodes] : no anterior cervical lymphadenopathy [No CVA Tenderness] : no CVA  tenderness [No Spinal Tenderness] : no spinal tenderness [No Joint Swelling] : no joint swelling [Grossly Normal Strength/Tone] : grossly normal strength/tone [No Rash] : no rash [Coordination Grossly Intact] : coordination grossly intact [No Focal Deficits] : no focal deficits [Normal Gait] : normal gait [Deep Tendon Reflexes (DTR)] : deep tendon reflexes were 2+ and symmetric [Normal Affect] : the affect was normal [Normal Insight/Judgement] : insight and judgment were intact [de-identified] : L tmj click

## 2025-02-25 NOTE — ASSESSMENT
[FreeTextEntry1] : CAD- s/p 2 addl stents, stable, cardio f/u , cont atorvastatin,asa,effient,metoprolol- colchicine  DM2-, cont , glimepiride, jardiance, alogliptin, trulicity Htn- stable, cont losartan,metoprolol, HLD- stable, cont atorvastatin Duputyrens- f/u with hand surg [place]- hearing aides Bilat Foot Callus- pod eval. allergic rhinitis- ryaltris GERD- lifestyle mod, pantopr.  tmj- avoid nsaids due to asa/cad. avoid chewy foods,tylenol, warm compress.  headache- resolves spont. monitor for now. if recurs. consider muscle relax.  Pt. was encouraged to do all relevant screening tests including but not limited to colonoscopy, annual psa, annual skin exam.  see optho yearly chk feet nightly take all meds as prescribed aware of complications of diabetes including but not limited to retinopathy,neuropathy, cvd, esrd,cva,amputations.  aware of the importance of diet and exercise and maintaining an adequate weight chk labs  Discussed the importance and benefit of a healthy lifestyle including a heart healthy diet such as the Mediterranean diet and regular exercise as well as 7 hours of sleep nightly.

## 2025-02-25 NOTE — PHYSICAL EXAM
[No Acute Distress] : no acute distress [Well Nourished] : well nourished [Well Developed] : well developed [Well-Appearing] : well-appearing [Normal Sclera/Conjunctiva] : normal sclera/conjunctiva [PERRL] : pupils equal round and reactive to light [EOMI] : extraocular movements intact [Normal Outer Ear/Nose] : the outer ears and nose were normal in appearance [Normal Oropharynx] : the oropharynx was normal [No JVD] : no jugular venous distention [No Lymphadenopathy] : no lymphadenopathy [Supple] : supple [Thyroid Normal, No Nodules] : the thyroid was normal and there were no nodules present [No Respiratory Distress] : no respiratory distress  [No Accessory Muscle Use] : no accessory muscle use [Clear to Auscultation] : lungs were clear to auscultation bilaterally [Normal Rate] : normal rate  [Regular Rhythm] : with a regular rhythm [Normal S1, S2] : normal S1 and S2 [No Murmur] : no murmur heard [No Carotid Bruits] : no carotid bruits [No Abdominal Bruit] : a ~M bruit was not heard ~T in the abdomen [No Varicosities] : no varicosities [Pedal Pulses Present] : the pedal pulses are present [No Edema] : there was no peripheral edema [No Palpable Aorta] : no palpable aorta [No Extremity Clubbing/Cyanosis] : no extremity clubbing/cyanosis [Soft] : abdomen soft [Non Tender] : non-tender [Non-distended] : non-distended [No Masses] : no abdominal mass palpated [No HSM] : no HSM [Normal Bowel Sounds] : normal bowel sounds [Normal Posterior Cervical Nodes] : no posterior cervical lymphadenopathy [Normal Anterior Cervical Nodes] : no anterior cervical lymphadenopathy [No CVA Tenderness] : no CVA  tenderness [No Spinal Tenderness] : no spinal tenderness [No Joint Swelling] : no joint swelling [Grossly Normal Strength/Tone] : grossly normal strength/tone [No Rash] : no rash [Coordination Grossly Intact] : coordination grossly intact [No Focal Deficits] : no focal deficits [Normal Gait] : normal gait [Deep Tendon Reflexes (DTR)] : deep tendon reflexes were 2+ and symmetric [Normal Affect] : the affect was normal [Normal Insight/Judgement] : insight and judgment were intact [de-identified] : L tmj click

## 2025-03-06 NOTE — HISTORY OF PRESENT ILLNESS
[FreeTextEntry1] : 59M with CAD s/p multiple PCI who presents for follow up  Pending correction of Dupuytren contracture in hand  No further CP Under a lot of work stress, working long hours, late nights, weekends On and off ranolazine, stopped taking as of late as he had not been feeling as much chest discomfort Last crt 1.28, stable  Now on asa monotherapy  HISTORY:  In 2019- pt was in a restaurant, started to feel a discomfort in his chest. Called EMS, ended up with 2 stents at [facility] Similar episodes in August 2022, felt like he was "overheating" went to [facility] and ended up with 2 more stents 1 month later to [facility] the [facility] for ongoing CP, was thought to be pericarditis, put on colchicine  Pt started feeling pressure in his chest Went to [facility] the [facility], was told that cardiac enzymes were negative, had cardiac cath with a 50% RCA lesion, patent stents (per patient report)   Nonsmoker. Works for [employer] as    ECG: SR [facility] 6/2019: PCI to Cx, LAD x2 (XIENCE) [facility] 8/29/22: PCI x pLAD, D1 (SYNERGY)  TTE 9/2023:  1. The left ventricular cavity is normal size. Left ventricular wall thickness is normal. Left ventricular systolic function is normal with an ejection fraction of 66 % by King's method of disks. 2. There is normal left ventricular diastolic function. 3. Right ventricular cavity is normal in size, normal wall thickness and normal systolic function. The tricuspid annular plane systolic excursion (TAPSE) is 2.4 cm (normal >=1.7 cm). 4. The left atrium is normal in size. 5. No significant valvular disease.  Asa 81mg Atorvastatin 80mg  (Ranolazine 500mg BID)   Losartan 50mg  Jardiance 25mg Trulicity

## 2025-03-06 NOTE — DISCUSSION/SUMMARY
[FreeTextEntry1] : CAD: s/p PCI x5. Anginal symptoms improved with ranolazine in past, now off. No further angina  On asa monotherapy given easy bruising Continue statin- last , ensure compliance Off Zetia  HTN: BP good. Cont meds. Crt improved watch closely   HLD: Lipids up a bit. Cont high dose statin. Watch diet. Normal AST/ALT  DM: Last a1c 9.1%, needs more aggressive control  This patient is able to perform >4 METS of activity without limitation. No evidence of ongoing ischemia, arrhythmia, valvular heart disease or decompensated heart failure.  This patient is optimized from a cardiac standpoint for this low risk surgery.  No further cardiac testing is necessary prior to surgery. Needs follow up with PCP to address a1c before surgery   RV 6M

## 2025-03-07 NOTE — PHYSICAL EXAM
[No Acute Distress] : no acute distress [Well Nourished] : well nourished [Well Developed] : well developed [Well-Appearing] : well-appearing [Normal Sclera/Conjunctiva] : normal sclera/conjunctiva [PERRL] : pupils equal round and reactive to light [EOMI] : extraocular movements intact [Normal Outer Ear/Nose] : the outer ears and nose were normal in appearance [Normal Oropharynx] : the oropharynx was normal [No JVD] : no jugular venous distention [No Lymphadenopathy] : no lymphadenopathy [Supple] : supple [Thyroid Normal, No Nodules] : the thyroid was normal and there were no nodules present [No Respiratory Distress] : no respiratory distress  [No Accessory Muscle Use] : no accessory muscle use [Clear to Auscultation] : lungs were clear to auscultation bilaterally [Normal Rate] : normal rate  [Regular Rhythm] : with a regular rhythm [Normal S1, S2] : normal S1 and S2 [No Murmur] : no murmur heard [No Carotid Bruits] : no carotid bruits [No Abdominal Bruit] : a ~M bruit was not heard ~T in the abdomen [No Varicosities] : no varicosities [Pedal Pulses Present] : the pedal pulses are present [No Edema] : there was no peripheral edema [No Palpable Aorta] : no palpable aorta [No Extremity Clubbing/Cyanosis] : no extremity clubbing/cyanosis [Soft] : abdomen soft [Non Tender] : non-tender [Non-distended] : non-distended [No Masses] : no abdominal mass palpated [No HSM] : no HSM [Normal Bowel Sounds] : normal bowel sounds [Normal Posterior Cervical Nodes] : no posterior cervical lymphadenopathy [Normal Anterior Cervical Nodes] : no anterior cervical lymphadenopathy [No CVA Tenderness] : no CVA  tenderness [No Spinal Tenderness] : no spinal tenderness [No Joint Swelling] : no joint swelling [Grossly Normal Strength/Tone] : grossly normal strength/tone [No Rash] : no rash [Coordination Grossly Intact] : coordination grossly intact [No Focal Deficits] : no focal deficits [Normal Gait] : normal gait [Deep Tendon Reflexes (DTR)] : deep tendon reflexes were 2+ and symmetric [Normal Affect] : the affect was normal [Normal Insight/Judgement] : insight and judgment were intact [de-identified] : L tmj click

## 2025-03-07 NOTE — HISTORY OF PRESENT ILLNESS
[FreeTextEntry1] : The patient is here for a follow up visit to review their medications and chronic medical conditions.  cad,dm2 [de-identified] : Here for follow up visit. Doing well. Appetitie, sleep,bms,voiding, mood all ok.  Interim and relevant labs, imaging and consultations reviewed.  leg cramps- calves, mild. 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 also on atorv 80, drinks plenty of fluids/gatorade cardio noted.  a1c elevtated Reviewed all interim as well as relevant prior consultations, labs and radiological studies.

## 2025-03-07 NOTE — ASSESSMENT
[FreeTextEntry1] : CAD- s/p 2 addl stents, stable, cardio f/u , cont atorvastatin,asa,effient,metoprolol- colchicine  DM2-, cont , glimepiride, jardiance, alogliptin, trulicity Htn- stable, cont losartan,metoprolol, HLD- stable, cont atorvastatin Duputyrens- f/u with hand surg [place]- hearing aides Bilat Foot Callus- pod eval. allergic rhinitis- ryaltris GERD- lifestyle mod, pantopr.  leg cramps- coq10 200mg, mag glyc 200mg , hydrate.  itchy scalp- no rash- fluonin oil prn, cerave qd Pt. was encouraged to do all relevant screening tests including but not limited to colonoscopy, annual psa, annual skin exam.  see optho yearly chk feet nightly take all meds as prescribed aware of complications of diabetes including but not limited to retinopathy,neuropathy, cvd, esrd,cva,amputations.  aware of the importance of diet and exercise and maintaining an adequate weight chk labs  Discussed the importance and benefit of a healthy lifestyle including a heart healthy diet such as the Mediterranean diet and regular exercise as well as 7 hours of sleep nightly.

## 2025-04-02 NOTE — HISTORY OF PRESENT ILLNESS
[FreeTextEntry1] : nutri eval [de-identified] : a1c 9.4, didnt get mounjaro yet, pharm. didnt stock it. i [facility]- zetia added, didnt get it yet Otherwise feels good. Appet, sleep, bms, voiding, mood all ok. no pain.  Reviewed all interim as well as relevant prior consultations, labs and radiological studies.

## 2025-04-02 NOTE — ASSESSMENT
[FreeTextEntry1] : CAD- s/p 2 addl stents, stable, cardio f/u , cont atorvastatin,asa,effient,metoprolol- colchicine  DM2-, cont , glimepiride, jardiance, alogliptin,mounjaro- agree with nutritionist recs.  Htn- stable, cont losartan,metoprolol, HLD- stable, cont atorvastatin, Duputyrens- f/u with hand surg [place]- hearing aides Bilat Foot Callus- pod eval. allergic rhinitis- ryaltris GERD- lifestyle mod, pantopr.  leg cramps- coq10 200mg, mag glyc 200mg , hydrate.  itchy scalp- no rash- fluonin oil prn, cerave qd Pt. was encouraged to do all relevant screening tests including but not limited to colonoscopy, annual psa, annual skin exam.  see optho yearly chk feet nightly take all meds as prescribed aware of complications of diabetes including but not limited to retinopathy,neuropathy, cvd, esrd,cva,amputations.  aware of the importance of diet and exercise and maintaining an adequate weight chk labs  Discussed the importance and benefit of a healthy lifestyle including a heart healthy diet such as the Mediterranean diet and regular exercise as well as 7 hours of sleep nightly.   [Vaccines Reviewed] : Immunizations reviewed today. Please see immunization details in the vaccine log within the immunization flowsheet.

## 2025-04-16 NOTE — ASSESSMENT
[Vaccines Reviewed] : Immunizations reviewed today. Please see immunization details in the vaccine log within the immunization flowsheet.  [FreeTextEntry1] : CAD- s/p 2 addl stents, stable, cardio f/u , cont atorvastatin,asa,effient,metoprolol- colchicine  DM2-, cont , glimepiride, jardiance, alogliptin,mounjaro- agree with nutritionist recs.  Htn- stable, cont losartan,metoprolol, HLD- stable, cont atorvastatin, Duputyrens- f/u with hand surg [place]- hearing aides Bilat Foot Callus- pod eval. allergic rhinitis- ryaltris GERD- lifestyle mod, pantopr.  leg cramps- coq10 200mg, mag glyc 200mg , hydrate.  itchy scalp- no rash- fluonin oil prn, cerave qd knuckle pad- trial clobetasol and amlactin.  Pt. was encouraged to do all relevant screening tests including but not limited to colonoscopy, annual psa, annual skin exam.  see optho yearly chk feet nightly take all meds as prescribed aware of complications of diabetes including but not limited to retinopathy,neuropathy, cvd, esrd,cva,amputations.  aware of the importance of diet and exercise and maintaining an adequate weight chk labs  Discussed the importance and benefit of a healthy lifestyle including a heart healthy diet such as the Mediterranean diet and regular exercise as well as 7 hours of sleep nightly.

## 2025-04-16 NOTE — ASSESSMENT
[Vaccines Reviewed] : Immunizations reviewed today. Please see immunization details in the vaccine log within the immunization flowsheet.  [FreeTextEntry1] : CAD- s/p 2 addl stents, stable, cardio f/u , cont atorvastatin,asa,effient,metoprolol- colchicine  DM2-, cont , glimepiride, jardiance, alogliptin,mounjaro- agree with nutritionist recs.  Htn- stable, cont losartan,metoprolol, HLD- stable, cont atorvastatin, Duputyrens- f/u with hand surg [place]- hearing aides Bilat Foot Callus- pod eval. allergic rhinitis- ryaltris GERD- lifestyle mod, pantopr.  leg cramps- coq10 200mg, mag glyc 200mg , hydrate.  itchy scalp- no rash- fluonin oil prn, cerave qd knuckle pad- trial clobetasol and amlactin.  Pt. was encouraged to do all relevant screening tests including but not limited to colonoscopy, annual psa, annual skin exam.  see optho yearly chk feet nightly take all meds as prescribed aware of complications of diabetes including but not limited to retinopathy,neuropathy, cvd, esrd,cva,amputations.  aware of the importance of diet and exercise and maintaining an adequate weight chk labs  Discussed the importance and benefit of a healthy lifestyle including a heart healthy diet such as the Mediterranean diet and regular exercise as well as 7 hours of sleep nightly.

## 2025-04-16 NOTE — HISTORY OF PRESENT ILLNESS
[FreeTextEntry1] : finger complaint [de-identified] : R knuckle pad on 2nd digit.  same hand as dup contract Otherwise feels good. Appet, sleep, bms, voiding, mood all ok.  Reviewed all interim as well as relevant prior consultations, labs and radiological studies.

## 2025-04-16 NOTE — HISTORY OF PRESENT ILLNESS
[FreeTextEntry1] : finger complaint [de-identified] : R knuckle pad on 2nd digit.  same hand as dup contract Otherwise feels good. Appet, sleep, bms, voiding, mood all ok.  Reviewed all interim as well as relevant prior consultations, labs and radiological studies.

## 2025-04-16 NOTE — PHYSICAL EXAM
[No Acute Distress] : no acute distress [Well Nourished] : well nourished [Well Developed] : well developed [Well-Appearing] : well-appearing [Normal] : no acute distress, well nourished, well developed and well-appearing [Normal Sclera/Conjunctiva] : normal sclera/conjunctiva [PERRL] : pupils equal round and reactive to light [EOMI] : extraocular movements intact [Normal Outer Ear/Nose] : the outer ears and nose were normal in appearance [Normal Oropharynx] : the oropharynx was normal [No JVD] : no jugular venous distention [No Lymphadenopathy] : no lymphadenopathy [Supple] : supple [Thyroid Normal, No Nodules] : the thyroid was normal and there were no nodules present [No Respiratory Distress] : no respiratory distress  [No Accessory Muscle Use] : no accessory muscle use [Clear to Auscultation] : lungs were clear to auscultation bilaterally [Normal Rate] : normal rate  [Regular Rhythm] : with a regular rhythm [Normal S1, S2] : normal S1 and S2 [No Murmur] : no murmur heard [No Carotid Bruits] : no carotid bruits [No Abdominal Bruit] : a ~M bruit was not heard ~T in the abdomen [No Varicosities] : no varicosities [Pedal Pulses Present] : the pedal pulses are present [No Edema] : there was no peripheral edema [No Palpable Aorta] : no palpable aorta [No Extremity Clubbing/Cyanosis] : no extremity clubbing/cyanosis [Non Tender] : non-tender [Soft] : abdomen soft [Non-distended] : non-distended [No Masses] : no abdominal mass palpated [No HSM] : no HSM [Normal Bowel Sounds] : normal bowel sounds [Normal Posterior Cervical Nodes] : no posterior cervical lymphadenopathy [Normal Anterior Cervical Nodes] : no anterior cervical lymphadenopathy [No CVA Tenderness] : no CVA  tenderness [No Spinal Tenderness] : no spinal tenderness [No Joint Swelling] : no joint swelling [Grossly Normal Strength/Tone] : grossly normal strength/tone [No Rash] : no rash [Coordination Grossly Intact] : coordination grossly intact [No Focal Deficits] : no focal deficits [Normal Gait] : normal gait [Deep Tendon Reflexes (DTR)] : deep tendon reflexes were 2+ and symmetric [Normal Affect] : the affect was normal [Normal Insight/Judgement] : insight and judgment were intact [de-identified] : R hand duptyrens contract of 5th tendon, 5th finger is flexed at 90deg., on R hand 2nd digit- knuckle pad noted on pip jt.

## 2025-04-16 NOTE — PHYSICAL EXAM
[No Acute Distress] : no acute distress [Well Nourished] : well nourished [Well Developed] : well developed [Well-Appearing] : well-appearing [Normal] : no acute distress, well nourished, well developed and well-appearing [Normal Sclera/Conjunctiva] : normal sclera/conjunctiva [PERRL] : pupils equal round and reactive to light [EOMI] : extraocular movements intact [Normal Outer Ear/Nose] : the outer ears and nose were normal in appearance [Normal Oropharynx] : the oropharynx was normal [No JVD] : no jugular venous distention [No Lymphadenopathy] : no lymphadenopathy [Supple] : supple [Thyroid Normal, No Nodules] : the thyroid was normal and there were no nodules present [No Respiratory Distress] : no respiratory distress  [No Accessory Muscle Use] : no accessory muscle use [Clear to Auscultation] : lungs were clear to auscultation bilaterally [Normal Rate] : normal rate  [Regular Rhythm] : with a regular rhythm [Normal S1, S2] : normal S1 and S2 [No Murmur] : no murmur heard [No Carotid Bruits] : no carotid bruits [No Abdominal Bruit] : a ~M bruit was not heard ~T in the abdomen [No Varicosities] : no varicosities [Pedal Pulses Present] : the pedal pulses are present [No Edema] : there was no peripheral edema [No Palpable Aorta] : no palpable aorta [No Extremity Clubbing/Cyanosis] : no extremity clubbing/cyanosis [Soft] : abdomen soft [Non Tender] : non-tender [Non-distended] : non-distended [No Masses] : no abdominal mass palpated [No HSM] : no HSM [Normal Bowel Sounds] : normal bowel sounds [Normal Posterior Cervical Nodes] : no posterior cervical lymphadenopathy [Normal Anterior Cervical Nodes] : no anterior cervical lymphadenopathy [No CVA Tenderness] : no CVA  tenderness [No Spinal Tenderness] : no spinal tenderness [No Joint Swelling] : no joint swelling [Grossly Normal Strength/Tone] : grossly normal strength/tone [No Rash] : no rash [Coordination Grossly Intact] : coordination grossly intact [No Focal Deficits] : no focal deficits [Normal Gait] : normal gait [Deep Tendon Reflexes (DTR)] : deep tendon reflexes were 2+ and symmetric [Normal Affect] : the affect was normal [Normal Insight/Judgement] : insight and judgment were intact [de-identified] : R hand duptyrens contract of 5th tendon, 5th finger is flexed at 90deg., on R hand 2nd digit- knuckle pad noted on pip jt.

## 2025-04-21 NOTE — HISTORY OF PRESENT ILLNESS
[FreeTextEntry1] : finger pain.  [de-identified] : dup contract  worsening.  was bothering him yest.  needs to get a release but surg wont do it until dm under better control last a1c was 9.4 Otherwise feels good. Appet, sleep, bms, voiding, mood all ok. no pain.  Reviewed all interim as well as relevant prior consultations, labs and radiological studies.

## 2025-04-21 NOTE — ASSESSMENT
[FreeTextEntry1] : CAD- s/p 2 addl stents, stable, cardio f/u , cont atorvastatin,asa,effient,metoprolol- colchicine  DM2-, cont , glimepiride, jardiance, alogliptin,mounjaro- agree with nutritionist recs. bgs have improved. rto in 1m to rpt a1c Htn- stable, cont losartan,metoprolol, HLD- stable, cont atorvastatin, Duputyrens- f/u with hand surg, surg pending better a1c.  [place] Bilat Foot Callus- pod eval. allergic rhinitis- ryaltris GERD- lifestyle mod, pantopr.  leg cramps- coq10 200mg, mag glyc 200mg , hydrate.  itchy scalp- no rash- fluonin oil prn, cerave qd knuckle pad- trial clobetasol and amlactin.  Pt. was encouraged to do all relevant screening tests including but not limited to colonoscopy, annual psa, annual skin exam.  see optho yearly chk feet nightly take all meds as prescribed aware of complications of diabetes including but not limited to retinopathy,neuropathy, cvd, esrd,cva,amputations.  aware of the importance of diet and exercise and maintaining an adequate weight chk labs  Discussed the importance and benefit of a healthy lifestyle including a heart healthy diet such as the Mediterranean diet and regular exercise as well as 7 hours of sleep nightly.   [Vaccines Reviewed] : Immunizations reviewed today. Please see immunization details in the vaccine log within the immunization flowsheet.

## 2025-04-21 NOTE — PHYSICAL EXAM
[No Acute Distress] : no acute distress [Well Nourished] : well nourished [Well Developed] : well developed [Well-Appearing] : well-appearing [Normal] : no acute distress, well nourished, well developed and well-appearing [Normal Sclera/Conjunctiva] : normal sclera/conjunctiva [PERRL] : pupils equal round and reactive to light [EOMI] : extraocular movements intact [Normal Outer Ear/Nose] : the outer ears and nose were normal in appearance [Normal Oropharynx] : the oropharynx was normal [No JVD] : no jugular venous distention [No Lymphadenopathy] : no lymphadenopathy [Supple] : supple [Thyroid Normal, No Nodules] : the thyroid was normal and there were no nodules present [No Respiratory Distress] : no respiratory distress  [No Accessory Muscle Use] : no accessory muscle use [Clear to Auscultation] : lungs were clear to auscultation bilaterally [Normal Rate] : normal rate  [Regular Rhythm] : with a regular rhythm [Normal S1, S2] : normal S1 and S2 [No Murmur] : no murmur heard [No Carotid Bruits] : no carotid bruits [No Abdominal Bruit] : a ~M bruit was not heard ~T in the abdomen [No Varicosities] : no varicosities [Pedal Pulses Present] : the pedal pulses are present [No Edema] : there was no peripheral edema [No Palpable Aorta] : no palpable aorta [No Extremity Clubbing/Cyanosis] : no extremity clubbing/cyanosis [Soft] : abdomen soft [Non Tender] : non-tender [Non-distended] : non-distended [No Masses] : no abdominal mass palpated [No HSM] : no HSM [Normal Bowel Sounds] : normal bowel sounds [Normal Posterior Cervical Nodes] : no posterior cervical lymphadenopathy [Normal Anterior Cervical Nodes] : no anterior cervical lymphadenopathy [No CVA Tenderness] : no CVA  tenderness [No Spinal Tenderness] : no spinal tenderness [No Joint Swelling] : no joint swelling [Grossly Normal Strength/Tone] : grossly normal strength/tone [No Rash] : no rash [Coordination Grossly Intact] : coordination grossly intact [No Focal Deficits] : no focal deficits [Normal Gait] : normal gait [Deep Tendon Reflexes (DTR)] : deep tendon reflexes were 2+ and symmetric [Normal Affect] : the affect was normal [Normal Insight/Judgement] : insight and judgment were intact [de-identified] : R hand duptyrens contract of 5th tendon, 5th finger is flexed at 90deg., on R hand 2nd digit- knuckle pad noted on pip jt.

## 2025-07-03 NOTE — ASSESSMENT
[FreeTextEntry1] : CAD- s/p 2 addl stents, stable, cardio f/u , cont atorvastatin,asa,effient,metoprolol- colchicine Now with chest pain- ekg is normal. vitals are normal. pt has minimal sympt , but given history advised to go to ER. discussed calling ems- they prefer to drive and as ekg nl and vss, i wont argue with that.  DM2-, cont , glimepiride, jardiance, alogliptin,mounjaro- agree with nutritionist recs. bgs have improved. rpt a1c, 1m early but pt looking to schedule his dupytr repair Htn- stable, cont losartan,metoprolol, HLD- stable, cont atorvastatin, Duputyrens- f/u with hand surg, surg pending better a1c.  [place] Bilat Foot Callus- pod eval. allergic rhinitis- ryaltris GERD- lifestyle mod, pantopr.  leg cramps- coq10 200mg, mag glyc 200mg , hydrate.  itchy scalp- no rash- fluonin oil prn, cerave qd knuckle pad- trial clobetasol and amlactin.  Pt. was encouraged to do all relevant screening tests including but not limited to colonoscopy, annual psa, annual skin exam.  see optho yearly chk feet nightly take all meds as prescribed aware of complications of diabetes including but not limited to retinopathy,neuropathy, cvd, esrd,cva,amputations.  aware of the importance of diet and exercise and maintaining an adequate weight chk labs  Discussed the importance and benefit of a healthy lifestyle including a heart healthy diet such as the Mediterranean diet and regular exercise as well as 7 hours of sleep nightly.

## 2025-07-03 NOTE — HISTORY OF PRESENT ILLNESS
[FreeTextEntry1] : chest pain [de-identified] : awoke today with back pain radiating to left chest. took asa and back feels better but still has some L chest vague symptoms tight sensation? no nausea, small amt of sob, no palps. , ? minimally sweaty- thinks its from the heat.  known cad/stents